# Patient Record
Sex: MALE | Race: WHITE | Employment: FULL TIME | RURAL
[De-identification: names, ages, dates, MRNs, and addresses within clinical notes are randomized per-mention and may not be internally consistent; named-entity substitution may affect disease eponyms.]

---

## 2017-11-01 ENCOUNTER — OFFICE VISIT (OUTPATIENT)
Dept: FAMILY MEDICINE CLINIC | Age: 52
End: 2017-11-01

## 2017-11-01 VITALS
SYSTOLIC BLOOD PRESSURE: 156 MMHG | DIASTOLIC BLOOD PRESSURE: 106 MMHG | RESPIRATION RATE: 18 BRPM | TEMPERATURE: 98.3 F | HEIGHT: 67 IN | WEIGHT: 244.5 LBS | HEART RATE: 68 BPM | BODY MASS INDEX: 38.37 KG/M2 | OXYGEN SATURATION: 98 %

## 2017-11-01 DIAGNOSIS — Z12.11 COLON CANCER SCREENING: ICD-10-CM

## 2017-11-01 DIAGNOSIS — M25.50 ARTHRALGIA, UNSPECIFIED JOINT: ICD-10-CM

## 2017-11-01 DIAGNOSIS — J30.81 CHRONIC ALLERGIC RHINITIS DUE TO ANIMAL HAIR AND DANDER: ICD-10-CM

## 2017-11-01 DIAGNOSIS — I10 ESSENTIAL HYPERTENSION: ICD-10-CM

## 2017-11-01 DIAGNOSIS — Z13.220 LIPID SCREENING: ICD-10-CM

## 2017-11-01 DIAGNOSIS — R10.13 EPIGASTRIC ABDOMINAL PAIN: ICD-10-CM

## 2017-11-01 DIAGNOSIS — K21.9 GASTROESOPHAGEAL REFLUX DISEASE WITHOUT ESOPHAGITIS: Primary | ICD-10-CM

## 2017-11-01 DIAGNOSIS — R53.82 CHRONIC FATIGUE: ICD-10-CM

## 2017-11-01 PROBLEM — J30.2 CHRONIC SEASONAL ALLERGIC RHINITIS DUE TO FUNGAL SPORES: Status: ACTIVE | Noted: 2017-11-01

## 2017-11-01 RX ORDER — HYDROCHLOROTHIAZIDE 25 MG/1
25 TABLET ORAL DAILY
Qty: 30 TAB | Refills: 5 | Status: SHIPPED | OUTPATIENT
Start: 2017-11-01 | End: 2018-05-01 | Stop reason: SDUPTHER

## 2017-11-01 NOTE — PROGRESS NOTES
Maximiliano Gabriel is a 46 y.o. male who presents with the following:  Chief Complaint   Patient presents with    GERD    Allergic Rhinitis    Hypertension    Abdominal Pain    Arthritis       GERD   The history is provided by the patient (Patient has a long history of reflux and heartburn which is been helped by omeprazole 20 mg but not totally relieved. ). Associated symptoms include chest pain (Mostly heartburn relieved with omeprazole) and abdominal pain. Pertinent negatives include no headaches and no shortness of breath. Allergic Rhinitis   The history is provided by the patient (Patient has a lower allergy to dog dander and uses fexofenadine to keep this under control so we can keep his dog. ). Associated symptoms include chest pain (Mostly heartburn relieved with omeprazole) and abdominal pain. Pertinent negatives include no headaches and no shortness of breath. Hypertension    The history is provided by the patient (Patient states his blood pressure does run up a bit and he attributes some of it to his weight gain and not working out because of his arthralgias). Associated symptoms include chest pain (Mostly heartburn relieved with omeprazole), malaise/fatigue and neck pain (Prior cervical surgery). Pertinent negatives include no orthopnea, no palpitations, no PND, no anxiety, no confusion, no blurred vision, no headaches, no tinnitus, no peripheral edema, no dizziness, no shortness of breath, no nausea and no vomiting. Abdominal Pain   The history is provided by the patient (Patient states she has about 2 hours of epigastric abdominal pain after eating which can be achy to sharp). Associated symptoms include chest pain (Mostly heartburn relieved with omeprazole) and abdominal pain. Pertinent negatives include no headaches and no shortness of breath.    Arthritis   The history is provided by the patient (Patient is concerned about his fatigue and the fact he is having pain bilaterally and the MPJs and in the proximal finger joints as well as his toes and most of his other joints also). Associated symptoms include chest pain (Mostly heartburn relieved with omeprazole) and abdominal pain. Pertinent negatives include no headaches and no shortness of breath. No Known Allergies    Current Outpatient Prescriptions   Medication Sig    hydroCHLOROthiazide (HYDRODIURIL) 25 mg tablet Take 1 Tab by mouth daily. Indications: hypertension    aspirin 81 mg chewable tablet Take 81 mg by mouth daily.  cholecalciferol (VITAMIN D3) 1,000 unit tablet Take  by mouth daily.  melissa's wort 150 mg cap Take  by mouth daily.  fexofenadine (ALLEGRA) 180 mg tablet Take  by mouth daily. Indications: ALLERGIC RHINITIS    omeprazole (PRILOSEC) 20 mg capsule Take 20 mg by mouth daily. Indications: GASTROESOPHAGEAL REFLUX    GLUC BRADSHAW/CHONDRO BRADSHAW A/VIT C/MN (GLUCOSAMINE 1500 COMPLEX PO) Take  by mouth daily. No current facility-administered medications for this visit.         Past Medical History:   Diagnosis Date    Allergic rhinitis     GERD (gastroesophageal reflux disease)        Past Surgical History:   Procedure Laterality Date    HX CERVICAL DISKECTOMY      X 2    HX HERNIA REPAIR      ing X 3    HX KNEE ARTHROSCOPY      knee surg X5    HX VASECTOMY         Family History   Problem Relation Age of Onset    Heart Disease Mother     Breast Cancer Mother    [facility] Hypertension Mother    [facility] Stroke Mother     Heart Disease Father     Heart Disease Maternal Grandmother     Stroke Sister      cerebral aneurysm    Asthma Son     Heart Disease Sister     Hypertension Sister     Diabetes Neg Hx        Social History     Social History    Marital status:      Spouse name: N/A    Number of children: N/A    Years of education: N/A     Social History Main Topics    Smoking status: Never Smoker    Smokeless tobacco: Never Used    Alcohol use 0.0 - 2.4 oz/week     0 - 4 Cans of beer per week    Drug use: No    Sexual activity: Not Asked     Other Topics Concern    None     Social History Narrative       Review of Systems   Constitutional: Positive for malaise/fatigue. Negative for chills, fever and weight loss. HENT: Negative for congestion, hearing loss, sore throat and tinnitus. Eyes: Negative for blurred vision, pain and discharge. Respiratory: Negative for cough, shortness of breath and wheezing. Cardiovascular: Positive for chest pain (Mostly heartburn relieved with omeprazole). Negative for palpitations, orthopnea, claudication, leg swelling and PND. Gastrointestinal: Positive for abdominal pain. Negative for constipation, heartburn, nausea and vomiting. Genitourinary: Negative for dysuria, frequency and urgency. Musculoskeletal: Positive for back pain (Prior lumbar surgery), joint pain and neck pain (Prior cervical surgery). Negative for falls and myalgias. Skin: Negative for itching and rash. Neurological: Negative for dizziness, tingling, tremors and headaches. Endo/Heme/Allergies: Negative for environmental allergies and polydipsia. Psychiatric/Behavioral: Negative for confusion, depression and substance abuse. The patient is not nervous/anxious. Visit Vitals    BP (!) 156/106 (BP 1 Location: Left arm, BP Patient Position: Sitting)    Pulse 68    Temp 98.3 °F (36.8 °C) (Oral)    Resp 18    Ht 5' 7\" (1.702 m)    Wt 244 lb 8 oz (110.9 kg)    SpO2 98%    BMI 38.29 kg/m2     Physical Exam   Constitutional: He is oriented to person, place, and time and well-developed, well-nourished, and in no distress. Patient is muscular but still obese   HENT:   Head: Normocephalic and atraumatic. Nose: Nose normal.   Mouth/Throat: Oropharynx is clear and moist.   Eyes: Conjunctivae and EOM are normal. Pupils are equal, round, and reactive to light. Neck: Normal range of motion. Neck supple. No JVD present. No tracheal deviation present. No thyromegaly present.    Scars are well-healed on his neck. Cardiovascular: Normal rate, regular rhythm, normal heart sounds and intact distal pulses. Exam reveals no gallop and no friction rub. No murmur heard. Pulmonary/Chest: Effort normal and breath sounds normal. No respiratory distress. He has no wheezes. He has no rales. He exhibits no tenderness. Abdominal: Soft. Bowel sounds are normal. He exhibits no distension and no mass. There is no tenderness. There is no rebound and no guarding. Musculoskeletal: Normal range of motion. He exhibits no edema or tenderness. Scars in the patient's neck and back are well-healed   Lymphadenopathy:     He has no cervical adenopathy. Neurological: He is alert and oriented to person, place, and time. He has normal reflexes. No cranial nerve deficit. He exhibits normal muscle tone. Gait normal. Coordination normal.   Skin: Skin is warm and dry. No rash noted. No erythema. Psychiatric: Mood, memory, affect and judgment normal.   Vitals reviewed. ICD-10-CM ICD-9-CM    1. Gastroesophageal reflux disease without esophagitis K21.9 530.81    2. Arthralgia, unspecified joint D31.42 473.26 METABOLIC PANEL, COMPREHENSIVE      NHI, DIRECT, W/REFLEX      SED RATE (ESR)      CBC WITH AUTOMATED DIFF      RHEUMATOID FACTOR, QL      LYME AB/WESTERN BLOT REFLEX   3. Chronic fatigue R53.82 780.79 THYROID PANEL W/TSH      METABOLIC PANEL, COMPREHENSIVE      NHI, DIRECT, W/REFLEX      SED RATE (ESR)      CBC WITH AUTOMATED DIFF      RHEUMATOID FACTOR, QL      LYME AB/WESTERN BLOT REFLEX   4. Epigastric abdominal pain I21.07 488.73 METABOLIC PANEL, COMPREHENSIVE      CBC WITH AUTOMATED DIFF      US ABD COMP      AMYLASE      LIPASE   5. Chronic allergic rhinitis due to animal hair and dander J30.81 477.2    6. Lipid screening Z13.220 V77.91 LIPID PANEL   7.  Colon cancer screening Z12.11 V76.51 REFERRAL FOR COLONOSCOPY   8. Essential hypertension I10 401.9 hydroCHLOROthiazide (HYDRODIURIL) 25 mg tablet       Orders Placed This Encounter    US ABD COMP     Standing Status:   Future     Standing Expiration Date:   12/1/2018    THYROID PANEL W/TSH    METABOLIC PANEL, COMPREHENSIVE    LIPID PANEL    NHI, DIRECT, W/REFLEX    SED RATE (ESR)    CBC WITH AUTOMATED DIFF    RHEUMATOID FACTOR, QL    LYME AB/WESTERN BLOT REFLEX    AMYLASE    LIPASE    REFERRAL FOR COLONOSCOPY     Referral Priority:   Routine     Referral Type:   Consultation     Referral Reason:   Specialty Services Required     Referred to Provider:   Latha Jones MD    hydroCHLOROthiazide (HYDRODIURIL) 25 mg tablet     Sig: Take 1 Tab by mouth daily. Indications: hypertension     Dispense:  30 Tab     Refill:  5   I told the patient I did not want to wait on his blood pressure as there is a history of it being up already and the longer we wait the stiffer his blood vessels will become and the heart rate will be to get the blood pressure down. As the patient's mother had rheumatoid arthritis I believe this will put him at a little high risk for a connective tissue disease but it remains to be seeing which one.     Follow-up Disposition: Not on Yanick Rios MD

## 2017-11-01 NOTE — MR AVS SNAPSHOT
Visit Information Date & Time Provider Department Dept. Phone Encounter #  
 11/1/2017  4:00 PM Hima Maria MD [employer] [facility] [phone] [id number] Your Appointments 11/22/2017  7:40 AM  
Follow Up with MD Jeremias Schmidt [facility]) Appt Note: 4 week f/u  
 [facility] 67 [id number] [phone]  
  
   
 [address] [place] [address] [phone] Upcoming Health Maintenance Date Due Hepatitis C Screening 1965 DTaP/Tdap/Td series (1 - Tdap) 6/28/1986 FOBT Q 1 YEAR AGE 50-75 6/28/2015 Allergies as of 11/1/2017  Review Complete On: 11/1/2017 By: Hima Maria MD  
 No Known Allergies Current Immunizations  Never Reviewed No immunizations on file. Not reviewed this visit You Were Diagnosed With   
  
 Codes Comments Gastroesophageal reflux disease without esophagitis    -  Primary ICD-10-CM: K21.9 ICD-9-CM: 530.81 Arthralgia, unspecified joint     ICD-10-CM: M25.50 ICD-9-CM: 719.40 Chronic fatigue     ICD-10-CM: R53.82 
ICD-9-CM: 780.79 Epigastric abdominal pain     ICD-10-CM: R10.13 ICD-9-CM: 789.06 Chronic allergic rhinitis due to animal hair and dander     ICD-10-CM: J30.81 ICD-9-CM: 477.2 Lipid screening     ICD-10-CM: O10.150 ICD-9-CM: V77.91 Colon cancer screening     ICD-10-CM: Z12.11 ICD-9-CM: V76.51 Essential hypertension     ICD-10-CM: I10 
ICD-9-CM: 401.9 Vitals BP Pulse Temp Resp Height(growth percentile) Weight(growth percentile) (!) 156/106 (BP 1 Location: Left arm, BP Patient Position: Sitting) 68 98.3 °F (36.8 °C) (Oral) 18 5' 7\" (1.702 m) 244 lb 8 oz (110.9 kg) SpO2 BMI Smoking Status 98% 38.29 kg/m2 Never Smoker Vitals History BMI and BSA Data Body Mass Index Body Surface Area  
 38.29 kg/m 2 2.29 m 2 Preferred Pharmacy Pharmacy Name Phone [facility] [address] [facility] [address] [place] [phone] Your Updated Medication List  
  
   
This list is accurate as of: 11/1/17  4:51 PM.  Always use your most recent med list.  
  
  
  
  
 aspirin 81 mg chewable tablet Take 81 mg by mouth daily. fexofenadine 180 mg tablet Commonly known as:  Giuliano Flax Take  by mouth daily. Indications: ALLERGIC RHINITIS  
  
 GLUCOSAMINE 1500 COMPLEX PO Take  by mouth daily. hydroCHLOROthiazide 25 mg tablet Commonly known as:  HYDRODIURIL Take 1 Tab by mouth daily. Indications: hypertension PriLOSEC 20 mg capsule Generic drug:  omeprazole Take 20 mg by mouth daily. Indications: GASTROESOPHAGEAL REFLUX  
  
 melissa's wort 150 mg Cap Take  by mouth daily. VITAMIN D3 1,000 unit tablet Generic drug:  cholecalciferol Take  by mouth daily. Prescriptions Sent to Pharmacy Refills  
 hydroCHLOROthiazide (HYDRODIURIL) 25 mg tablet 5 Sig: Take 1 Tab by mouth daily. Indications: hypertension Class: Normal  
 [facility] [employer] [place] [address], [address] [place] [address] [address]  Ph #: [phone] Route: Oral  
  
We Performed the Following AMYLASE [id number] CPT(R)] NHI, DIRECT, W/REFLEX [id number] CPT(R)] CBC WITH AUTOMATED DIFF [20651 CPT(R)] LIPASE [id number] CPT(R)] LIPID PANEL [80063 CPT(R)] LYME AB/WESTERN BLOT REFLEX [id number] CPT(R)] METABOLIC PANEL, COMPREHENSIVE [40044 CPT(R)] REFERRAL FOR COLONOSCOPY [DAY571 Custom] RHEUMATOID FACTOR, QL [id number] CPT(R)] SED RATE (ESR) [id number] CPT(R)] THYROID PANEL W/TSH [14056 CPT(R)] To-Do List   
 12/01/2017 Imaging:  US ABD COMP Referral Information Referral ID Referred By Referred To  
  
 [id number] Harish Aponte, [facility], MD   
   [address] [place] [address] Phone: [phone] Fax: [phone] Visits Status Start Date End Date 1 New Request 11/1/17 11/1/18 If your referral has a status of pending review or denied, additional information will be sent to support the outcome of this decision. Introducing [facility]! Yunior Matson introduces Sharethrough patient portal. Now you can access parts of your medical record, email your doctor's office, and request medication refills online. 1. In your internet browser, go to [email or web address] 2. Click on the First Time User? Click Here link in the Sign In box. You will see the New Member Sign Up page. 3. Enter your Sharethrough Access Code exactly as it appears below. You will not need to use this code after youve completed the sign-up process. If you do not sign up before the expiration date, you must request a new code. · Sharethrough Access Code: [email or web address] Expires: 1/30/2018  4:50 PM 
 
4. Enter the last four digits of your Social Security Number (xxxx) and Date of Birth (mm/dd/yyyy) as indicated and click Submit. You will be taken to the next sign-up page. 5. Create a Sharethrough ID. This will be your Sharethrough login ID and cannot be changed, so think of one that is secure and easy to remember. 6. Create a Sharethrough password. You can change your password at any time. 7. Enter your Password Reset Question and Answer. This can be used at a later time if you forget your password. 8. Enter your e-mail address. You will receive e-mail notification when new information is available in [address] 9. Click Sign Up. You can now view and download portions of your medical record. 10. Click the Download Summary menu link to download a portable copy of your medical information. If you have questions, please visit the Frequently Asked Questions section of the Sharethrough website. Remember, Sharethrough is NOT to be used for urgent needs. For medical emergencies, dial 911. Now available from your iPhone and Android! Please provide this summary of care documentation to your next provider. Lyme Disease Testing Disclaimer:   
 § 04.1-1026.3. (Expires July 1, 2018) Lyme disease testing information disclosure. A. Every licensee or his in-office designee who orders a laboratory test for the presence of Lyme disease shall provide to the patient or his legal representative the following written information: \"ACCORDING TO THE CENTERS FOR DISEASE CONTROL AND PREVENTION, AS OF 2011 LYME DISEASE IS THE SIXTH FASTEST GROWING DISEASE IN THE [place]. YOUR HEALTH CARE PROVIDER HAS ORDERED A LABORATORY TEST FOR THE PRESENCE OF LYME DISEASE FOR YOU. CURRENT LABORATORY TESTING FOR LYME DISEASE CAN BE PROBLEMATIC AND STANDARD LABORATORY TESTS OFTEN RESULT IN FALSE NEGATIVE AND FALSE POSITIVE RESULTS, AND IF DONE TOO EARLY, YOU MAY NOT HAVE PRODUCED ENOUGH ANTIBODIES TO BE CONSIDERED POSITIVE BECAUSE YOUR IMMUNE RESPONSE REQUIRES TIME TO DEVELOP ANTIBODIES. IF YOU ARE TESTED FOR LYME DISEASE, AND THE RESULTS ARE NEGATIVE, THIS DOES NOT NECESSARILY MEAN YOU DO NOT HAVE LYME DISEASE. IF YOU CONTINUE TO EXPERIENCE SYMPTOMS, YOU SHOULD CONTACT YOUR HEALTH CARE PROVIDER AND INQUIRE ABOUT THE APPROPRIATENESS OF RETESTING OR ADDITIONAL TREATMENT. \"  
B. Licensees shall be immune from civil liability for the provision of the written information required by this section absent gross negligence or willful misconduct. Your primary care clinician is listed as Maria T Ochoa. If you have any questions after today's visit, please call [phone].

## 2017-11-02 LAB
ALBUMIN SERPL-MCNC: 4.4 G/DL (ref 3.5–5.5)
ALBUMIN/GLOB SERPL: 1.8 {RATIO} (ref 1.2–2.2)
ALP SERPL-CCNC: 40 IU/L (ref 39–117)
ALT SERPL-CCNC: 21 IU/L (ref 0–44)
AMYLASE SERPL-CCNC: 71 U/L (ref 31–124)
ANA SER QL: NEGATIVE
AST SERPL-CCNC: 22 IU/L (ref 0–40)
B BURGDOR IGG+IGM SER-ACNC: <0.91 ISR (ref 0–0.9)
B BURGDOR IGM SER IA-ACNC: <0.8 INDEX (ref 0–0.79)
BASOPHILS # BLD AUTO: 0 X10E3/UL (ref 0–0.2)
BASOPHILS NFR BLD AUTO: 1 %
BILIRUB SERPL-MCNC: 0.3 MG/DL (ref 0–1.2)
BUN SERPL-MCNC: 17 MG/DL (ref 6–24)
BUN/CREAT SERPL: 20 (ref 9–20)
CALCIUM SERPL-MCNC: 9.2 MG/DL (ref 8.7–10.2)
CHLORIDE SERPL-SCNC: 102 MMOL/L (ref 96–106)
CHOLEST SERPL-MCNC: 167 MG/DL (ref 100–199)
CO2 SERPL-SCNC: 23 MMOL/L (ref 18–29)
CREAT SERPL-MCNC: 0.87 MG/DL (ref 0.76–1.27)
EOSINOPHIL # BLD AUTO: 0.2 X10E3/UL (ref 0–0.4)
EOSINOPHIL NFR BLD AUTO: 4 %
ERYTHROCYTE [DISTWIDTH] IN BLOOD BY AUTOMATED COUNT: 12.2 % (ref 12.3–15.4)
ERYTHROCYTE [SEDIMENTATION RATE] IN BLOOD BY WESTERGREN METHOD: 2 MM/HR (ref 0–30)
FT4I SERPL CALC-MCNC: 1.8 (ref 1.2–4.9)
GFR SERPLBLD CREATININE-BSD FMLA CKD-EPI: 115 ML/MIN/1.73
GFR SERPLBLD CREATININE-BSD FMLA CKD-EPI: 99 ML/MIN/1.73
GLOBULIN SER CALC-MCNC: 2.5 G/DL (ref 1.5–4.5)
GLUCOSE SERPL-MCNC: 94 MG/DL (ref 65–99)
HCT VFR BLD AUTO: 40 % (ref 37.5–51)
HDLC SERPL-MCNC: 33 MG/DL
HGB BLD-MCNC: 14.2 G/DL (ref 12.6–17.7)
IMM GRANULOCYTES # BLD: 0 X10E3/UL (ref 0–0.1)
IMM GRANULOCYTES NFR BLD: 0 %
INTERPRETATION, 910389: NORMAL
LDLC SERPL CALC-MCNC: 93 MG/DL (ref 0–99)
LIPASE SERPL-CCNC: 60 U/L (ref 13–78)
LYMPHOCYTES # BLD AUTO: 1.7 X10E3/UL (ref 0.7–3.1)
LYMPHOCYTES NFR BLD AUTO: 35 %
MCH RBC QN AUTO: 30.9 PG (ref 26.6–33)
MCHC RBC AUTO-ENTMCNC: 35.5 G/DL (ref 31.5–35.7)
MCV RBC AUTO: 87 FL (ref 79–97)
MONOCYTES # BLD AUTO: 0.3 X10E3/UL (ref 0.1–0.9)
MONOCYTES NFR BLD AUTO: 6 %
NEUTROPHILS # BLD AUTO: 2.6 X10E3/UL (ref 1.4–7)
NEUTROPHILS NFR BLD AUTO: 54 %
PLATELET # BLD AUTO: 170 X10E3/UL (ref 150–379)
POTASSIUM SERPL-SCNC: 4.1 MMOL/L (ref 3.5–5.2)
PROT SERPL-MCNC: 6.9 G/DL (ref 6–8.5)
RBC # BLD AUTO: 4.6 X10E6/UL (ref 4.14–5.8)
RHEUMATOID FACT SERPL-ACNC: <10 IU/ML (ref 0–13.9)
SODIUM SERPL-SCNC: 140 MMOL/L (ref 134–144)
T3RU NFR SERPL: 27 % (ref 24–39)
T4 SERPL-MCNC: 6.8 UG/DL (ref 4.5–12)
TRIGL SERPL-MCNC: 203 MG/DL (ref 0–149)
TSH SERPL DL<=0.005 MIU/L-ACNC: 2.76 UIU/ML (ref 0.45–4.5)
VLDLC SERPL CALC-MCNC: 41 MG/DL (ref 5–40)
WBC # BLD AUTO: 4.7 X10E3/UL (ref 3.4–10.8)

## 2017-11-06 ENCOUNTER — TELEPHONE (OUTPATIENT)
Dept: FAMILY MEDICINE CLINIC | Age: 52
End: 2017-11-06

## 2017-11-06 NOTE — TELEPHONE ENCOUNTER
Informed patient that he can address this with Dr. Samanta Simeon as well and that the first appt with her is to discuss everything not the actual colonoscopy. Informed him to call if he feels that he needs to be seen here before that appt otherwise he will attend the appt with Dr. Maile Marti on 11/14/17. Patient thankful and states understanding.

## 2017-11-06 NOTE — TELEPHONE ENCOUNTER
Pt called noting that at his last appoint he was questioned about having a hernia. After he left he has been thinking about his symptoms and thinks it may be the case.  He is scheduled to see Dr. Tiff Traore 11/14 to set up colonoscopy and is asking if this will have impact on that appointment and would like a call to discuss

## 2017-11-08 DIAGNOSIS — K80.12 CALCULUS OF GALLBLADDER WITH ACUTE ON CHRONIC CHOLECYSTITIS WITHOUT OBSTRUCTION: Primary | ICD-10-CM

## 2017-11-21 ENCOUNTER — DOCUMENTATION ONLY (OUTPATIENT)
Dept: FAMILY MEDICINE CLINIC | Age: 52
End: 2017-11-21

## 2018-05-01 ENCOUNTER — OFFICE VISIT (OUTPATIENT)
Dept: FAMILY MEDICINE CLINIC | Age: 53
End: 2018-05-01

## 2018-05-01 VITALS
RESPIRATION RATE: 18 BRPM | OXYGEN SATURATION: 97 % | DIASTOLIC BLOOD PRESSURE: 88 MMHG | BODY MASS INDEX: 38.77 KG/M2 | HEART RATE: 66 BPM | HEIGHT: 67 IN | WEIGHT: 247 LBS | TEMPERATURE: 98 F | SYSTOLIC BLOOD PRESSURE: 128 MMHG

## 2018-05-01 DIAGNOSIS — M25.50 ARTHRALGIA, UNSPECIFIED JOINT: ICD-10-CM

## 2018-05-01 DIAGNOSIS — J30.81 CHRONIC ALLERGIC RHINITIS DUE TO ANIMAL HAIR AND DANDER: ICD-10-CM

## 2018-05-01 DIAGNOSIS — I10 ESSENTIAL HYPERTENSION: Primary | ICD-10-CM

## 2018-05-01 DIAGNOSIS — K21.9 GASTROESOPHAGEAL REFLUX DISEASE WITHOUT ESOPHAGITIS: ICD-10-CM

## 2018-05-01 DIAGNOSIS — Z11.59 ENCOUNTER FOR HEPATITIS C SCREENING TEST FOR LOW RISK PATIENT: ICD-10-CM

## 2018-05-01 DIAGNOSIS — E66.01 SEVERE OBESITY (BMI 35.0-39.9) WITH COMORBIDITY (HCC): ICD-10-CM

## 2018-05-01 RX ORDER — NAPROXEN 500 MG/1
500 TABLET ORAL 2 TIMES DAILY WITH MEALS
Qty: 60 TAB | Refills: 5 | Status: SHIPPED | OUTPATIENT
Start: 2018-05-01 | End: 2019-09-17 | Stop reason: ALTCHOICE

## 2018-05-01 RX ORDER — HYDROCHLOROTHIAZIDE 25 MG/1
25 TABLET ORAL DAILY
Qty: 30 TAB | Refills: 5 | Status: SHIPPED | OUTPATIENT
Start: 2018-05-01 | End: 2019-03-12 | Stop reason: SDUPTHER

## 2018-05-01 NOTE — PROGRESS NOTES
Mamadou Cardenas is a 46 y.o. male who presents with the following:  Chief Complaint   Patient presents with    Hypertension       Hypertension    The history is provided by the patient (Patient is doing well with no chest pain or shortness of breath but has gained about 40 pounds since his 2 surgeries this past year which kept him from doing his usual exercises and he just sat around eating.). Pertinent negatives include no chest pain, no orthopnea, no palpitations, no malaise/fatigue, no blurred vision, no headaches, no tinnitus, no dizziness and no shortness of breath. Toe Pain    The history is provided by the patient (Patient with pain on the ball of his left foot just proximal to the second toe). Pertinent negatives include no tingling and no itching. No Known Allergies    Current Outpatient Prescriptions   Medication Sig    hydroCHLOROthiazide (HYDRODIURIL) 25 mg tablet Take 1 Tab by mouth daily. Indications: hypertension    naproxen (NAPROSYN) 500 mg tablet Take 1 Tab by mouth two (2) times daily (with meals). Indications: Tendonitis    aspirin 81 mg chewable tablet Take 81 mg by mouth daily.  GLUC BRADSHAW/CHONDRO BRADSHAW A/VIT C/MN (GLUCOSAMINE 1500 COMPLEX PO) Take  by mouth daily.  cholecalciferol (VITAMIN D3) 1,000 unit tablet Take  by mouth daily.  melissa's wort 150 mg cap Take  by mouth daily.  fexofenadine (ALLEGRA) 180 mg tablet Take  by mouth daily. Indications: ALLERGIC RHINITIS    omeprazole (PRILOSEC) 20 mg capsule Take 20 mg by mouth daily. Indications: GASTROESOPHAGEAL REFLUX     No current facility-administered medications for this visit.         Past Medical History:   Diagnosis Date    Allergic rhinitis     GERD (gastroesophageal reflux disease)        Past Surgical History:   Procedure Laterality Date    HX CERVICAL DISKECTOMY      X 2    HX HERNIA REPAIR      ing X 3    HX KNEE ARTHROSCOPY      knee surg X5    HX VASECTOMY         Family History   Problem Relation Age of Onset    Heart Disease Mother     Breast Cancer Mother     Hypertension Mother     Stroke Mother     Heart Disease Father     Heart Disease Maternal Grandmother     Stroke Sister      cerebral aneurysm    Asthma Son     Heart Disease Sister     Hypertension Sister     Diabetes Neg Hx        Social History     Social History    Marital status:      Spouse name: N/A    Number of children: N/A    Years of education: N/A     Social History Main Topics    Smoking status: Never Smoker    Smokeless tobacco: Never Used    Alcohol use 0.0 - 2.4 oz/week     0 - 4 Cans of beer per week    Drug use: No    Sexual activity: Not on file     Other Topics Concern    Not on file     Social History Narrative       Review of Systems   Constitutional: Negative for chills, fever, malaise/fatigue and weight loss. HENT: Negative for congestion, hearing loss, sore throat and tinnitus. Eyes: Negative for blurred vision, pain and discharge. Respiratory: Negative for cough, shortness of breath and wheezing. Cardiovascular: Negative for chest pain, palpitations, orthopnea, claudication and leg swelling. Gastrointestinal: Negative for abdominal pain, constipation and heartburn. Genitourinary: Negative for dysuria, frequency and urgency. Musculoskeletal: Positive for joint pain (Multiple joints). Negative for falls and myalgias. Patient shoulder is doing better after his rotator cuff surgery   Skin: Negative for itching and rash. Neurological: Negative for dizziness, tingling, tremors and headaches. Endo/Heme/Allergies: Negative for environmental allergies and polydipsia. Psychiatric/Behavioral: Negative for depression and substance abuse. The patient is not nervous/anxious.         Visit Vitals    /88 (BP 1 Location: Left arm)    Pulse 66    Temp 98 °F (36.7 °C)    Resp 18    Ht 5' 7\" (1.702 m)    Wt 247 lb (112 kg)    SpO2 97%    BMI 38.69 kg/m2     Physical Exam ICD-10-CM ICD-9-CM    1. Essential hypertension I10 401.9 hydroCHLOROthiazide (HYDRODIURIL) 25 mg tablet      METABOLIC PANEL, COMPREHENSIVE      LIPID PANEL   2. Gastroesophageal reflux disease without esophagitis K21.9 530.81    3. Severe obesity (BMI 35.0-39. 9) with comorbidity ([facility] E66.01 278.01    4. Chronic allergic rhinitis due to animal hair and dander J30.81 477.2    5. Arthralgia, unspecified joint M25.50 719.40 naproxen (NAPROSYN) 500 mg tablet   6. Encounter for hepatitis C screening test for low risk patient Z11.59 V73.89 HEPATITIS C AB       Orders Placed This Encounter    HEPATITIS C AB     Standing Status:   Future     Standing Expiration Date:   70/5/2294    METABOLIC PANEL, COMPREHENSIVE     Standing Status:   Future     Standing Expiration Date:   11/1/2018    LIPID PANEL     Standing Status:   Future     Standing Expiration Date:   11/1/2018    hydroCHLOROthiazide (HYDRODIURIL) 25 mg tablet     Sig: Take 1 Tab by mouth daily. Indications: hypertension     Dispense:  30 Tab     Refill:  5    naproxen (NAPROSYN) 500 mg tablet     Sig: Take 1 Tab by mouth two (2) times daily (with meals).  Indications: Tendonitis     Dispense:  60 Tab     Refill:  5   Recheck in 6 months    Follow-up Disposition: Not on Ale Terry MD

## 2018-11-11 DIAGNOSIS — I10 ESSENTIAL HYPERTENSION: ICD-10-CM

## 2018-11-14 RX ORDER — HYDROCHLOROTHIAZIDE 25 MG/1
TABLET ORAL
Qty: 30 TAB | Refills: 0 | Status: SHIPPED | OUTPATIENT
Start: 2018-11-14 | End: 2019-03-12 | Stop reason: SDUPTHER

## 2019-03-12 PROBLEM — N40.0 BPH WITHOUT OBSTRUCTION/LOWER URINARY TRACT SYMPTOMS: Status: ACTIVE | Noted: 2019-03-12

## 2019-03-12 PROBLEM — N52.02 CORPORO-VENOUS OCCLUSIVE ERECTILE DYSFUNCTION: Status: ACTIVE | Noted: 2019-03-12

## 2019-09-17 PROBLEM — E29.1 MALE HYPOGONADISM: Status: ACTIVE | Noted: 2019-09-17

## 2021-06-15 PROBLEM — N48.6 PEYRONIE'S DISEASE: Status: ACTIVE | Noted: 2021-06-15

## 2022-09-28 ENCOUNTER — TRANSCRIBE ORDER (OUTPATIENT)
Dept: REGISTRATION | Age: 57
End: 2022-09-28

## 2022-09-28 ENCOUNTER — HOSPITAL ENCOUNTER (OUTPATIENT)
Dept: LAB | Age: 57
Discharge: HOME | End: 2022-09-28
Payer: COMMERCIAL

## 2022-09-28 ENCOUNTER — HOSPITAL ENCOUNTER (OUTPATIENT)
Dept: NON INVASIVE DIAGNOSTICS | Age: 57
Discharge: HOME | End: 2022-09-28
Payer: COMMERCIAL

## 2022-09-28 DIAGNOSIS — Z01.812 BLOOD TESTS PRIOR TO TREATMENT OR PROCEDURE: ICD-10-CM

## 2022-09-28 DIAGNOSIS — M75.102 ROTATOR CUFF SYNDROME OF LEFT SHOULDER: Primary | ICD-10-CM

## 2022-09-28 DIAGNOSIS — M75.102 ROTATOR CUFF SYNDROME OF LEFT SHOULDER: ICD-10-CM

## 2022-09-28 LAB
ALBUMIN SERPL-MCNC: 4 G/DL (ref 3.4–5)
ALBUMIN/GLOB SERPL: 1.4 {RATIO} (ref 0.8–1.7)
ALP SERPL-CCNC: 43 U/L (ref 45–117)
ALT SERPL-CCNC: 24 U/L (ref 16–61)
ANION GAP SERPL CALC-SCNC: 2 MMOL/L (ref 3–18)
AST SERPL-CCNC: 15 U/L (ref 10–38)
ATRIAL RATE: 60 BPM
BASOPHILS # BLD: 0.1 K/UL (ref 0–0.1)
BASOPHILS NFR BLD: 1 % (ref 0–2)
BILIRUB SERPL-MCNC: 0.6 MG/DL (ref 0.2–1)
BUN SERPL-MCNC: 15 MG/DL (ref 7–18)
BUN/CREAT SERPL: 20 (ref 12–20)
CALCIUM SERPL-MCNC: 8.7 MG/DL (ref 8.5–10.1)
CALCULATED P AXIS, ECG09: 25 DEGREES
CALCULATED R AXIS, ECG10: 38 DEGREES
CALCULATED T AXIS, ECG11: 17 DEGREES
CHLORIDE SERPL-SCNC: 108 MMOL/L (ref 100–111)
CO2 SERPL-SCNC: 30 MMOL/L (ref 21–32)
CREAT SERPL-MCNC: 0.74 MG/DL (ref 0.6–1.3)
DIAGNOSIS, 93000: NORMAL
DIFFERENTIAL METHOD BLD: ABNORMAL
EOSINOPHIL # BLD: 0.4 K/UL (ref 0–0.4)
EOSINOPHIL NFR BLD: 8 % (ref 0–5)
ERYTHROCYTE [DISTWIDTH] IN BLOOD BY AUTOMATED COUNT: 11.9 % (ref 11.6–14.5)
GLOBULIN SER CALC-MCNC: 2.8 G/DL (ref 2–4)
GLUCOSE SERPL-MCNC: 98 MG/DL (ref 74–99)
HCT VFR BLD AUTO: 41.3 % (ref 36–48)
HGB BLD-MCNC: 14.1 G/DL (ref 13–16)
IMM GRANULOCYTES # BLD AUTO: 0 K/UL (ref 0–0.04)
IMM GRANULOCYTES NFR BLD AUTO: 0 % (ref 0–0.5)
LYMPHOCYTES # BLD: 1.3 K/UL (ref 0.9–3.6)
LYMPHOCYTES NFR BLD: 27 % (ref 21–52)
MCH RBC QN AUTO: 31.4 PG (ref 24–34)
MCHC RBC AUTO-ENTMCNC: 34.1 G/DL (ref 31–37)
MCV RBC AUTO: 92 FL (ref 78–100)
MONOCYTES # BLD: 0.3 K/UL (ref 0.05–1.2)
MONOCYTES NFR BLD: 7 % (ref 3–10)
NEUTS SEG # BLD: 2.7 K/UL (ref 1.8–8)
NEUTS SEG NFR BLD: 57 % (ref 40–73)
NRBC # BLD: 0 K/UL (ref 0–0.01)
NRBC BLD-RTO: 0 PER 100 WBC
P-R INTERVAL, ECG05: 146 MS
PLATELET # BLD AUTO: 173 K/UL (ref 135–420)
PMV BLD AUTO: 9.4 FL (ref 9.2–11.8)
POTASSIUM SERPL-SCNC: 3.9 MMOL/L (ref 3.5–5.5)
PROT SERPL-MCNC: 6.8 G/DL (ref 6.4–8.2)
Q-T INTERVAL, ECG07: 422 MS
QRS DURATION, ECG06: 84 MS
QTC CALCULATION (BEZET), ECG08: 422 MS
RBC # BLD AUTO: 4.49 M/UL (ref 4.35–5.65)
SODIUM SERPL-SCNC: 140 MMOL/L (ref 136–145)
VENTRICULAR RATE, ECG03: 60 BPM
WBC # BLD AUTO: 4.8 K/UL (ref 4.6–13.2)

## 2022-09-28 PROCEDURE — 36415 COLL VENOUS BLD VENIPUNCTURE: CPT

## 2022-09-28 PROCEDURE — 80053 COMPREHEN METABOLIC PANEL: CPT

## 2022-09-28 PROCEDURE — 93005 ELECTROCARDIOGRAM TRACING: CPT

## 2022-09-28 PROCEDURE — 85025 COMPLETE CBC W/AUTO DIFF WBC: CPT
